# Patient Record
Sex: MALE | Race: WHITE | Employment: FULL TIME | URBAN - METROPOLITAN AREA
[De-identification: names, ages, dates, MRNs, and addresses within clinical notes are randomized per-mention and may not be internally consistent; named-entity substitution may affect disease eponyms.]

---

## 2020-04-10 ENCOUNTER — OFFICE VISIT (OUTPATIENT)
Dept: PODIATRY | Facility: CLINIC | Age: 50
End: 2020-04-10
Payer: COMMERCIAL

## 2020-04-10 VITALS
WEIGHT: 240 LBS | SYSTOLIC BLOOD PRESSURE: 144 MMHG | HEIGHT: 75 IN | BODY MASS INDEX: 29.84 KG/M2 | DIASTOLIC BLOOD PRESSURE: 82 MMHG

## 2020-04-10 DIAGNOSIS — L60.0 INGROWING NAIL: Primary | ICD-10-CM

## 2020-04-10 DIAGNOSIS — L08.9 TOE INFECTION: ICD-10-CM

## 2020-04-10 DIAGNOSIS — M79.674 PAIN OF TOE OF RIGHT FOOT: ICD-10-CM

## 2020-04-10 PROCEDURE — 99203 OFFICE O/P NEW LOW 30 MIN: CPT | Mod: 25 | Performed by: PODIATRIST

## 2020-04-10 PROCEDURE — 11730 AVULSION NAIL PLATE SIMPLE 1: CPT | Mod: T5 | Performed by: PODIATRIST

## 2020-04-10 ASSESSMENT — PAIN SCALES - GENERAL: PAINLEVEL: MILD PAIN (2)

## 2020-04-10 ASSESSMENT — MIFFLIN-ST. JEOR: SCORE: 2039.26

## 2020-04-10 NOTE — PATIENT INSTRUCTIONS
Non Permanent Nail Removal Instructions       1. Keep bandage on until that evening or the day after your procedure. If the bandage falls off, start the soaking process.  2. Some bleeding is normal. If bleeding seems excessive to you, place ice on top of your foot for 15-20 minutes and elevate your foot above heart level.  3. Over the counter pain medication, elevating your foot and ice application is all you will need for pain control.  4. If the bandage feels too tight and your toe is throbbing it is ok to remove the bandage and start soaking.   5. Soaking process: Soak your foot twice a day in mild skin friendly soap (dish or hand soap) and warm water for 15 minutes. Do this for 2 weeks    It is ok to soak your foot for a few minutes to loosen the dressing applied in the clinic.     After soaking, blot dry and apply a regular band aid.  6. It is normal to experience some discomfort and redness around the nail for several days following the procedure. Drainage will likely appear a red- yellow. This is normal. If your toe is still draining a red-yellow fluid after 2 weeks continue to soak foot.  7. Initial discomfort might last for 2-3 days. You may resume with regular activity as soon as you are comfortable, as long as you keep the wound clean and dry and follow the soaking instruction. It is recommended that you do not enter public swimming pools/hot tubs while your toe is draining.  8. If you are experiencing worsening pain and redness or notice pus after 2-3 days please contact the clinic. If it is after hours call the clinic number and follow the voice prompter. This will direct you to an on call doctor.    Thank you for choosing [facility] / [facility]!    DR. CERON'S CLINIC LOCATIONS     MONDAY - [place] WEDNESDAY (AM ONLY) - [place]   [address] [address] [place] [address]   [phone] / [phone] / [phone]        THURSDAY - [place] SCHEDULE SURGERY: [phone]   [address] APPOINTMENTS: [phone]   [place] [address] BILLING QUESTIONS: [phone] / [phone]           FYI: BODY WEIGHT AND YOUR FEET  The following information is included in the after visit summary for all patients. Body weight can be a sensitive issue to discuss in clinic, but we think the following information is very important. Although we focus on the feet and ankles, we do support the overall health of our patients.     Many things can cause foot and ankle problems. Foot structure, activity level, foot mechanics and injuries are common causes of pain. One very important issue that often goes unmentioned, is body weight. Extra weight can cause increased stress on muscles, ligaments, bones and tendons. Sometimes just a few extra pounds is all it takes to put one over her/his threshold. Without reducing that stress, it can be difficult to alleviate pain. As Foot & Ankle specialists, our job is addressing the lower extremity problem and possible causes. Regarding extra body weight, we encourage patients to discuss diet and weight management plans with their primary care doctors. It is this team approach that gives you the best opportunity for pain relief and getting you back on your feet.      [facility] has a Comprehensive Weight Management Program. This program includes counseling, education, non-surgical and surgical approaches to weight loss. If you are interested in learning more either talk to you primary care provider or call [phone] Lamb to follow up with Primary Care provider regarding elevated blood pressure.

## 2020-04-10 NOTE — PROGRESS NOTES
"  ASSESSMENT/PLAN:  Encounter Diagnoses   Name Primary?     Ingrowing nail, lateral edge, right hallux Yes     Toe infection      Pain of toe of right foot      The potential causes and nature of an ingrown toenail were discussed with the patient.  We reviewed the natural history/prognosis of the condition and potential risks if no treatment is provided.      Treatment options discussed included conservative management (oral antibiotics when coexisting infection, soaking of foot, the use of a toe spacer, adequate width shoes)  as well as surgical management (partial or total nail removal).  The pros and cons of both forms of treatment were reviewed.      After thorough discussion and answering all questions, the patient elected to a partial nail avulsion.  I discussed the option of permanent removal, if a recurrent problem and when not infected. I explained that applying the chemical (phenol) creates a chemical burn, kills tissue and this is not ideal when there is an infection.      Nail Avulsion Procedure  (non permanent removal)    The procedure was discussed with the Adonis Pradhan, including risk of infection, abnormal nail regrowth, and possible need for an additional future nail procedure.  Post-procedure home cares were explained. These cares are important for preventing infection and aiding in timely healing.   Verbal and written consent was obtained.   The site was marked and the \"Time Out\" called.     The base of the right hllux was injected with 2 cc of  2% Lidocaine plain.  The toe was then prepped with betadine solution.  A tourniquet was applied around the base of the toe for hemostasis.   Next the toe was checked for adequate anesthesia.  The lateral nail was freed from the nail bed and marginal soft tissue attachments with a blunt instrument. ( A nail splitter was then used to make a longitudinal cut 2mm from the lateral nail fold.  It was completed, atraumatically, under the eponychium with a " "[place] blade. ) Next, the nail edge was firmly grasped with a hemostat and removed in total.  The underlying nail bed was inspected and no abnormalities seen.    Silvadene ointment was applied to the nail bed, followed by a compressive dressing.  The tourniquet was removed.  The distal toe turned immediately pink.  The foot was kept elevated for several minutes.  The patient tolerated the procedure well.      Adonis Pradhan is instructed to watch for, and call if,  increasing redness, drainage, and pain after 2-3 days.  Post procedure instructions were provided in the After Visit Summary. She is to contact us if she has any concerns.      Body mass index is 30 kg/m .    Weight management plan: Patient was referred to their PCP to discuss a diet and exercise plan.      Dejon Bustos DPM, FACKENDY, [facility] Department of Podiatry/Foot & Ankle Surgery      ____________________________________________________________________    HPI:         Chief Complaint: right big toenail infection  Onset of problem: 7 months. Problems started after he dropped some \"heavy boards\" on his toes and sustained fractures of the right hallux and the 2nd toe. There was subungual hematoma that was initially drained and the nail plate eventually came off.   With regrowth he developed an ingrown nail along the lateral edge. This was removed at some point, but with nail growth the problem returned.   Pain/ discomfort is described as:  sharp  Rating:  3/10   Frequency:  All day    The pain is made worse with bumping it  He reports pain, redness and drainage.    *There is no problem list on file for this patient.  *  *No past surgical history on file.*  *  No current outpatient medications on file.       ROS:     A 10-point review of systems was performed and is positive for that noted above in the HPI and as seen below.  All other areas are negative.     Numbness in feet?  no   Calf pain with walking? no  Recent foot/ankle injury? See " "HPI  Weight change over past 12 months? 40# loss  Self perception as overweight? no  Recent flu-like symptoms? no  Joint pain other than feet ? Knees, back, neck    Social History: Employment:  ;  Exercise/Physical activity:  Not much;  Tobacco use:  no  Social History     Socioeconomic History     Marital status:      Spouse name: Not on file     Number of children: Not on file     Years of education: Not on file     Highest education level: Not on file   Occupational History     Not on file   Social Needs     Financial resource strain: Not on file     Food insecurity     Worry: Not on file     Inability: Not on file     Transportation needs     Medical: Not on file     Non-medical: Not on file   Tobacco Use     Smoking status: Never Smoker     Smokeless tobacco: Never Used   Substance and Sexual Activity     Alcohol use: Not on file     Drug use: Not on file     Sexual activity: Not on file   Lifestyle     Physical activity     Days per week: Not on file     Minutes per session: Not on file     Stress: Not on file   Relationships     Social connections     Talks on phone: Not on file     Gets together: Not on file     Attends Moravian service: Not on file     Active member of club or organization: Not on file     Attends meetings of clubs or organizations: Not on file     Relationship status: Not on file     Intimate partner violence     Fear of current or ex partner: Not on file     Emotionally abused: Not on file     Physically abused: Not on file     Forced sexual activity: Not on file   Other Topics Concern     Not on file   Social History Narrative     Not on file       Family history:  No family history on file.    Rheumatoid arthritis:  no  Foot Problems: no  Diabetes: no      EXAM:    Vitals: BP (!) 144/82 (BP Location: Left arm, Patient Position: Chair, Cuff Size: Adult Large)   Ht 1.905 m (6' 3\")   Wt 108.9 kg (240 lb)   BMI 30.00 kg/m    BMI: Body mass index is 30 kg/m .  Height: " "6' 3\"    Constitutional/ general:  Pt is in no apparent distress, appears well-nourished.  Cooperative with history and physical exam.     Vascular:  Pedal pulses are palpable bilaterally for both the DP and PT arteries.  CFT < 3 sec.  No edema.  Pedal hair growth noted.     Neuro:  Alert and oriented x 3. Coordinated gait.  Light touch sensation is intact to the L4, L5, S1 distributions. No obvious deficits.  No evidence of neurological-based weakness, spasticity, or contracture in the lower extremities.     Derm: tenderness, erythema, mild edema along the lateral skin fold of the right hallux nail unit.     Musculoskeletal:    Lower extremity muscle strength is normal.  Patient is ambulatory without an assistive device or brace .  No gross deformities.          "

## 2020-04-10 NOTE — LETTER
"    4/10/2020         RE: Adonis Pradhan  [address]  [facility] [address]        Dear Colleague,    Thank you for referring your patient, Adonis Pradhan, to the [facility]. Please see a copy of my visit note below.      ASSESSMENT/PLAN:  Encounter Diagnoses   Name Primary?     Ingrowing nail, lateral edge, right hallux Yes     Toe infection      Pain of toe of right foot      The potential causes and nature of an ingrown toenail were discussed with the patient.  We reviewed the natural history/prognosis of the condition and potential risks if no treatment is provided.      Treatment options discussed included conservative management (oral antibiotics when coexisting infection, soaking of foot, the use of a toe spacer, adequate width shoes)  as well as surgical management (partial or total nail removal).  The pros and cons of both forms of treatment were reviewed.      After thorough discussion and answering all questions, the patient elected to a partial nail avulsion.  I discussed the option of permanent removal, if a recurrent problem and when not infected. I explained that applying the chemical (phenol) creates a chemical burn, kills tissue and this is not ideal when there is an infection.      Nail Avulsion Procedure  (non permanent removal)    The procedure was discussed with the Adonis Pradhan, including risk of infection, abnormal nail regrowth, and possible need for an additional future nail procedure.  Post-procedure home cares were explained. These cares are important for preventing infection and aiding in timely healing.   Verbal and written consent was obtained.   The site was marked and the \"Time Out\" called.     The base of the right hllux was injected with 2 cc of  2% Lidocaine plain.  The toe was then prepped with betadine solution.  A tourniquet was applied around the base of the toe for hemostasis.   Next the toe was checked for adequate anesthesia.  The lateral nail was " "freed from the nail bed and marginal soft tissue attachments with a blunt instrument. ( A nail splitter was then used to make a longitudinal cut 2mm from the lateral nail fold.  It was completed, atraumatically, under the eponychium with a [place] blade. ) Next, the nail edge was firmly grasped with a hemostat and removed in total.  The underlying nail bed was inspected and no abnormalities seen.    Silvadene ointment was applied to the nail bed, followed by a compressive dressing.  The tourniquet was removed.  The distal toe turned immediately pink.  The foot was kept elevated for several minutes.  The patient tolerated the procedure well.      Adonis Pradhan is instructed to watch for, and call if,  increasing redness, drainage, and pain after 2-3 days.  Post procedure instructions were provided in the After Visit Summary. She is to contact us if she has any concerns.      Body mass index is 30 kg/m .    Weight management plan: Patient was referred to their PCP to discuss a diet and exercise plan.      Dejon Bustos DPM, FACFAS, MS    [place] [facility] Department of Podiatry/Foot & Ankle Surgery      ____________________________________________________________________    HPI:         Chief Complaint: right big toenail infection  Onset of problem: 7 months. Problems started after he dropped some \"heavy boards\" on his toes and sustained fractures of the right hallux and the 2nd toe. There was subungual hematoma that was initially drained and the nail plate eventually came off.   With regrowth he developed an ingrown nail along the lateral edge. This was removed at some point, but with nail growth the problem returned.   Pain/ discomfort is described as:  sharp  Rating:  3/10   Frequency:  All day    The pain is made worse with bumping it  He reports pain, redness and drainage.    *There is no problem list on file for this patient.  *  *No past surgical history on file.*  *  No current outpatient medications on file. "       ROS:     A 10-point review of systems was performed and is positive for that noted above in the HPI and as seen below.  All other areas are negative.     Numbness in feet?  no   Calf pain with walking? no  Recent foot/ankle injury? See HPI  Weight change over past 12 months? 40# loss  Self perception as overweight? no  Recent flu-like symptoms? no  Joint pain other than feet ? Knees, back, neck    Social History: Employment:  ;  Exercise/Physical activity:  Not much;  Tobacco use:  no  Social History     Socioeconomic History     Marital status:      Spouse name: Not on file     Number of children: Not on file     Years of education: Not on file     Highest education level: Not on file   Occupational History     Not on file   Social Needs     Financial resource strain: Not on file     Food insecurity     Worry: Not on file     Inability: Not on file     Transportation needs     Medical: Not on file     Non-medical: Not on file   Tobacco Use     Smoking status: Never Smoker     Smokeless tobacco: Never Used   Substance and Sexual Activity     Alcohol use: Not on file     Drug use: Not on file     Sexual activity: Not on file   Lifestyle     Physical activity     Days per week: Not on file     Minutes per session: Not on file     Stress: Not on file   Relationships     Social connections     Talks on phone: Not on file     Gets together: Not on file     Attends Rastafari service: Not on file     Active member of club or organization: Not on file     Attends meetings of clubs or organizations: Not on file     Relationship status: Not on file     Intimate partner violence     Fear of current or ex partner: Not on file     Emotionally abused: Not on file     Physically abused: Not on file     Forced sexual activity: Not on file   Other Topics Concern     Not on file   Social History Narrative     Not on file       Family history:  No family history on file.    Rheumatoid arthritis:  no  Foot  "Problems: no  Diabetes: no      EXAM:    Vitals: BP (!) 144/82 (BP Location: Left arm, Patient Position: Chair, Cuff Size: Adult Large)   Ht 1.905 m (6' 3\")   Wt 108.9 kg (240 lb)   BMI 30.00 kg/m    BMI: Body mass index is 30 kg/m .  Height: 6' 3\"    Constitutional/ general:  Pt is in no apparent distress, appears well-nourished.  Cooperative with history and physical exam.     Vascular:  Pedal pulses are palpable bilaterally for both the DP and PT arteries.  CFT < 3 sec.  No edema.  Pedal hair growth noted.     Neuro:  Alert and oriented x 3. Coordinated gait.  Light touch sensation is intact to the L4, L5, S1 distributions. No obvious deficits.  No evidence of neurological-based weakness, spasticity, or contracture in the lower extremities.     Derm: tenderness, erythema, mild edema along the lateral skin fold of the right hallux nail unit.     Musculoskeletal:    Lower extremity muscle strength is normal.  Patient is ambulatory without an assistive device or brace .  No gross deformities.            Again, thank you for allowing me to participate in the care of your patient.        Sincerely,        Dejon Bustos, JORGE LUIS    "

## 2021-01-07 ENCOUNTER — OFFICE VISIT (OUTPATIENT)
Dept: PODIATRY | Facility: CLINIC | Age: 51
End: 2021-01-07
Payer: COMMERCIAL

## 2021-01-07 VITALS
HEIGHT: 75 IN | SYSTOLIC BLOOD PRESSURE: 112 MMHG | DIASTOLIC BLOOD PRESSURE: 82 MMHG | BODY MASS INDEX: 28.97 KG/M2 | WEIGHT: 233 LBS

## 2021-01-07 DIAGNOSIS — L60.0 INGROWN RIGHT BIG TOENAIL: ICD-10-CM

## 2021-01-07 DIAGNOSIS — M79.674 TOE PAIN, RIGHT: Primary | ICD-10-CM

## 2021-01-07 PROCEDURE — 99213 OFFICE O/P EST LOW 20 MIN: CPT | Mod: 25 | Performed by: PODIATRIST

## 2021-01-07 PROCEDURE — 11750 EXCISION NAIL&NAIL MATRIX: CPT | Mod: T5 | Performed by: PODIATRIST

## 2021-01-07 RX ORDER — FEXOFENADINE HCL 180 MG/1
180 TABLET ORAL DAILY
COMMUNITY

## 2021-01-07 ASSESSMENT — MIFFLIN-ST. JEOR: SCORE: 2002.51

## 2021-01-07 NOTE — PATIENT INSTRUCTIONS
Thank you for choosing [facility] / [facility]le Surgery!    DR. CERON'S CLINIC LOCATIONS     [facility] SCHEDULE SURGERY: [phone]   [address] APPOINTMENTS: [phone]   [place] [address] BILLING QUESTIONS: [phone]  [phone] RADIOLOGY: [phone]       [facility]    [address]    [place], [place] [address] [phone]  [phone]      Follow up: as needed    Please read through the following handouts and if you have any questions, please feel free to call us or send a [employer] message!      POST-PERMANENT NAIL REMOVAL  1. Over-the-counter pain medication (tylenol / ibuprofen), elevating your foot, and ice application to the top of the foot is all that you will need for pain control.    2. Some bleeding is normal. If bleeding seems excessive to you, place ice on top of your foot for 15-20 minutes and elevate your foot above heart level.   3. Keep bandage on until this evening or tomorrow morning. If the bandage falls off or if it feels too tight , start the soaking process below.  4. Soaking instructions:   a. Soak your foot twice a day for 15 minutes in a mild solution of warm water and soap for a minimum of 2-4 weeks. If your toe is still draining at 4 weeks, continue with soaking.    b. It s okay to soak your foot for a few minutes to loosen the bandage applied at your appointment.   c. After soaking, use a Q-tip to clean under and around the skin where the nail was removed. This helps get rid of the brownish material and helps the wound drain.    d. Blot your toe dry and apply a band-aid.  5. It is normal to experience some discomfort and redness around the nail for 1-2 weeks following the procedure. Drainage will likely appear brownish and thick at times. This is normal. It might drain for up to 2 months.  7. Initial discomfort might last 2-3 days. You may resume with regular activities at that time, as long as you keep the wound clean  and follow the soaking instructions. It is recommended that you do not enter a public swimming pool or hot tub while your toe is draining.   8. After 2-3 days, if you are experiencing worsening pain and redness, or notice pus, please contact the clinic. Ask to speak with a triage nurse and they will inform our team of your symptoms and we can advise if a follow up is needed.

## 2021-01-07 NOTE — PROGRESS NOTES
"ASSESSMENT:  Encounter Diagnoses   Name Primary?     Toe pain, right Yes     Ingrown right big toenail        MEDICAL DECISION MAKING:  This is a recurrent chronic problem.  He is at risk for infection in the toe.  There is some inflammation and edema.  This is more involved at the distal lateral nail unit.  We discussed a permanent removal of the lateral edge.  He is interested in this.   I explained that this will likely take longer to heal than the nail avulsion.  I also explained that if there is any current infection it might persist and require oral antibiotics.  Clearly, he might develop a postoperative infection.  No guarantees were given.    Chemical Matrixectomy/ Permanent nail removal:    The chemical matrixectomy produre was reviewed, including risks, benefits, and post-procedure cares.  The risk of discomfort and infection was discussed.  The chance of nail regrowth discussed.  Written consent was obtained.  The site was marked and  \"Time Out\" called.      The base of the right great toe was injected with 2 cc of  2% Lidocaine plain.  The toe was then prepped with betadine solution.  A tourniquet was applied around the base of the toe to provide hemostasis.   Next the toe was checked for adequate anesthesia.  With the patient comfortable, the right hallux nail was freed from the nail bed and marginal soft tissue attachments with a blunt instrument.  (A longitudinal cut in the nail was made 2mm from the lateral skin fold via a nail splitter.  It was completed under the epinychium with a [place] blade.)  The nail was firmly grasped with a hemostat and removed in total.      Using small applicator sticks, three applications for 30 seconds, of Phenol to the nail matrix were performed.  The area was diluted with a copious amount of isopropyl alcohol.  It was blotted dry.    Next Silvadene ointment was applied to the nail bed, followed by a compressive dressing.  The tourniquet was removed.  The distal toe " "became immediately pink.  The involved foot was kept elevated for several minutes.  Patient tolerated the procedure well. Post-procedure instruction handout provided.    Disclaimer: This note consists of symbols derived from keyboarding, dictation and/or voice recognition software. As a result, there may be errors in the script that have gone undetected. Please consider this when interpreting information found in this chart.    Dejon Bustos, JORGE LUIS, FACFAS, MS    [place] Department of Podiatry/Foot & Ankle Surgery      ____________________________________________________________________    HPI:         Chief Complaint: ingrown toenail, right halliux  Onset of problem: 9  months  Pain/ discomfort is described as:  burning  Pain Rating:  3/10   Frequency:  daliy    The pain is exacerbated by bumping  Previous treatment: soaking, two prior partial avulsions    *No past medical history on file.*  *No past surgical history on file.*  *  Current Outpatient Medications   Medication Sig Dispense Refill     fexofenadine (ALLEGRA) 180 MG tablet Take 180 mg by mouth daily       Multiple Vitamin (MULTI-VITAMIN DAILY PO) daily           EXAM:    Vitals: /82   Ht 1.905 m (6' 3\")   Wt 105.7 kg (233 lb)   BMI 29.12 kg/m    BMI: Body mass index is 29.12 kg/m .    Vascular:  Pedal pulses are palpable bilaterally for both the DP and PT arteries.  CFT < 3 sec.  No edema.  Pedal hair growth noted.      Neuro:  Alert and oriented x 3. Coordinated gait.  Light touch sensation is intact to the L4, L5, S1 distributions. No obvious deficits.  No evidence of neurological-based weakness, spasticity, or contracture in the lower extremities.      Derm: tenderness, erythema, mild edema along the lateral skin fold of the right hallux nail unit. This is more involved distally.      Musculoskeletal:    Lower extremity muscle strength is normal.  Patient is ambulatory without an assistive device or brace .  No gross deformities.       "

## 2021-01-07 NOTE — LETTER
"    1/7/2021         RE: Adonis Pradhan  [address]  [facility] [address]        Dear Colleague,    Thank you for referring your patient, Adonis Pradhan, to the [facility]. Please see a copy of my visit note below.    ASSESSMENT:  Encounter Diagnoses   Name Primary?     Toe pain, right Yes     Ingrown right big toenail        MEDICAL DECISION MAKING:  This is a recurrent chronic problem.  He is at risk for infection in the toe.  There is some inflammation and edema.  This is more involved at the distal lateral nail unit.  We discussed a permanent removal of the lateral edge.  He is interested in this.   I explained that this will likely take longer to heal than the nail avulsion.  I also explained that if there is any current infection it might persist and require oral antibiotics.  Clearly, he might develop a postoperative infection.  No guarantees were given.    Chemical Matrixectomy/ Permanent nail removal:    The chemical matrixectomy produre was reviewed, including risks, benefits, and post-procedure cares.  The risk of discomfort and infection was discussed.  The chance of nail regrowth discussed.  Written consent was obtained.  The site was marked and  \"Time Out\" called.      The base of the right great toe was injected with 2 cc of  2% Lidocaine plain.  The toe was then prepped with betadine solution.  A tourniquet was applied around the base of the toe to provide hemostasis.   Next the toe was checked for adequate anesthesia.  With the patient comfortable, the right hallux nail was freed from the nail bed and marginal soft tissue attachments with a blunt instrument.  (A longitudinal cut in the nail was made 2mm from the lateral skin fold via a nail splitter.  It was completed under the epinychium with a [place] blade.)  The nail was firmly grasped with a hemostat and removed in total.      Using small applicator sticks, three applications for 30 seconds, of Phenol to " "the nail matrix were performed.  The area was diluted with a copious amount of isopropyl alcohol.  It was blotted dry.    Next Silvadene ointment was applied to the nail bed, followed by a compressive dressing.  The tourniquet was removed.  The distal toe became immediately pink.  The involved foot was kept elevated for several minutes.  Patient tolerated the procedure well. Post-procedure instruction handout provided.    Disclaimer: This note consists of symbols derived from keyboarding, dictation and/or voice recognition software. As a result, there may be errors in the script that have gone undetected. Please consider this when interpreting information found in this chart.    Dejon Bustos, JORGE LUIS, FACFAS, MS    [place] [facility]le [facility]      ____________________________________________________________________    HPI:         Chief Complaint: ingrown toenail, right halliux  Onset of problem: 9  months  Pain/ discomfort is described as:  burning  Pain Rating:  3/10   Frequency:  daliy    The pain is exacerbated by bumping  Previous treatment: soaking, two prior partial avulsions    *No past medical history on file.*  *No past surgical history on file.*  *  Current Outpatient Medications   Medication Sig Dispense Refill     fexofenadine (ALLEGRA) 180 MG tablet Take 180 mg by mouth daily       Multiple Vitamin (MULTI-VITAMIN DAILY PO) daily           EXAM:    Vitals: /82   Ht 1.905 m (6' 3\")   Wt 105.7 kg (233 lb)   BMI 29.12 kg/m    BMI: Body mass index is 29.12 kg/m .    Vascular:  Pedal pulses are palpable bilaterally for both the DP and PT arteries.  CFT < 3 sec.  No edema.  Pedal hair growth noted.      Neuro:  Alert and oriented x 3. Coordinated gait.  Light touch sensation is intact to the L4, L5, S1 distributions. No obvious deficits.  No evidence of neurological-based weakness, spasticity, or contracture in the lower extremities.      Derm: tenderness, erythema, mild edema " along the lateral skin fold of the right hallux nail unit. This is more involved distally.      Musculoskeletal:    Lower extremity muscle strength is normal.  Patient is ambulatory without an assistive device or brace .  No gross deformities.           Again, thank you for allowing me to participate in the care of your patient.        Sincerely,        Dejon Bustos DPM

## 2021-05-27 ENCOUNTER — RECORDS - HEALTHEAST (OUTPATIENT)
Dept: ADMINISTRATIVE | Facility: CLINIC | Age: 51
End: 2021-05-27